# Patient Record
Sex: FEMALE | Race: WHITE
[De-identification: names, ages, dates, MRNs, and addresses within clinical notes are randomized per-mention and may not be internally consistent; named-entity substitution may affect disease eponyms.]

---

## 2017-07-17 ENCOUNTER — HOSPITAL ENCOUNTER (EMERGENCY)
Dept: HOSPITAL 41 - JD.ED | Age: 52
Discharge: HOME | End: 2017-07-17
Payer: COMMERCIAL

## 2017-07-17 VITALS — DIASTOLIC BLOOD PRESSURE: 76 MMHG | SYSTOLIC BLOOD PRESSURE: 162 MMHG

## 2017-07-17 DIAGNOSIS — H83.09: ICD-10-CM

## 2017-07-17 DIAGNOSIS — E78.00: ICD-10-CM

## 2017-07-17 DIAGNOSIS — E11.9: ICD-10-CM

## 2017-07-17 DIAGNOSIS — Z87.891: ICD-10-CM

## 2017-07-17 DIAGNOSIS — Z90.710: ICD-10-CM

## 2017-07-17 DIAGNOSIS — R07.89: Primary | ICD-10-CM

## 2017-07-17 DIAGNOSIS — Z88.6: ICD-10-CM

## 2017-07-17 PROCEDURE — 96361 HYDRATE IV INFUSION ADD-ON: CPT

## 2017-07-17 PROCEDURE — 99285 EMERGENCY DEPT VISIT HI MDM: CPT

## 2017-07-17 PROCEDURE — 36415 COLL VENOUS BLD VENIPUNCTURE: CPT

## 2017-07-17 PROCEDURE — 85025 COMPLETE CBC W/AUTO DIFF WBC: CPT

## 2017-07-17 PROCEDURE — 80053 COMPREHEN METABOLIC PANEL: CPT

## 2017-07-17 PROCEDURE — 93005 ELECTROCARDIOGRAM TRACING: CPT

## 2017-07-17 PROCEDURE — 96360 HYDRATION IV INFUSION INIT: CPT

## 2017-07-17 PROCEDURE — 84484 ASSAY OF TROPONIN QUANT: CPT

## 2017-07-17 NOTE — EDM.PDOC
ED HPI GENERAL MEDICAL PROBLEM





- General


Chief Complaint: Chest Pain


Stated Complaint: CHEST PAIN AND DIZZY


Time Seen by Provider: 07/17/17 07:42


Source of Information: Reports: Patient, Family, RN Notes Reviewed





- History of Present Illness


INITIAL COMMENTS - FREE TEXT/NARRATIVE: 





52-year-old lady comes in with severe vertigo but also anterior chest 

discomfort. This all started about 2 hours ago. She became aware of the vertigo 

when she first sat up in bed trying to get up to go to the bathroom. States the 

room was really "moving around on her. It was too severe to walk so after 

resting a bit she did crawl to and from the bathroom. However she also did have 

some anterior chest discomfort. She called her daughter who is in or in and 

with the associated chest discomfort and family history of stroke, heart 

disease and personal history of hyperlipidemia they felt she needed to be 

checked out. She continues to have vertigo on arrival to the ED. The vertigo is 

worse with motion, better to lie still.  She continues to have mild anterior 

chest heaviness.  She does not have pain radiating into either arm, neck or 

back. No abdominal pain. There's been some very mild nausea but no vomiting. 

She does have personal history of type 2 diabetes and hyperlipidemia. She does 

not smoke.


  ** Headache


Pain Score (Numeric/FACES): 5





  ** Chest


Pain Score (Numeric/FACES): 5





- Related Data


 Allergies











Allergy/AdvReac Type Severity Reaction Status Date / Time


 


oxycodone Allergy  Cannot Verified 07/17/17 07:39





   Remember  














Past Medical History


Cardiovascular History: Reports: High Cholesterol


Musculoskeletal History: Reports: RA


Endocrine/Metabolic History: Reports: Diabetes, Type II





- Past Surgical History


GI Surgical History: Reports: Hernia Repair/Other


Female  Surgical History: Reports: Hysterectomy





Social & Family History





- Tobacco Use


Smoking Status *Q: Former Smoker


Used Tobacco, but Quit: Yes


Month Tobacco Last Used: 20 years ago





- Caffeine Use


Caffeine Use: Reports: Coffee





- Recreational Drug Use


Recreational Drug Use: No





ED ROS GENERAL





- Review of Systems


Review Of Systems: See Below


Constitutional: Denies: Fever, Chills, Diaphoresis


HEENT: Reports: Vertigo.  Denies: Ear Pain, Sinus Problem


Respiratory: Denies: Shortness of Breath


Cardiovascular: Reports: Chest Pain (Mild anterior tightness and heaviness).  

Denies: Lightheadedness


GI/Abdominal: Reports: Nausea.  Denies: Abdominal Pain, Vomiting (Slight, gone)


Musculoskeletal: Denies: Neck Pain, Shoulder Pain, Arm Pain, Back Pain, Leg Pain


Skin: Reports: No Symptoms


Neurological: Reports: Dizziness, Difficulty Walking (Due to the severe vertigo)

.  Denies: Numbness, Tingling





ED EXAM, DIZZINESS





- Physical Exam


Exam: See Below


General Appearance: Alert, Anxious (Mild), Mild Distress


Eye Exam: Bilateral Eye: PERRL


Ears: Normal External Exam, Normal Canal, Normal TMs


Nose: Normal Inspection


Throat/Mouth: Normal Inspection, Normal Oropharynx


Head Exam: Atraumatic.  No: Facial Swelling


Vertigo: reproducible


Neck: Supple


Respiratory/Chest: No Respiratory Distress, Lungs Clear, Normal Breath Sounds


Cardiovascular: Regular Rate, Rhythm


GI/Abdominal: Soft, Non-Tender.  No: Guarding


Rectal (Female) Exam: Other


Neurological: No Motor/Sensory Deficits, Other (Finger to nose testing normal)


Extremities: Normal Inspection, Normal Range of Motion


Skin Exam: Warm, Dry, Normal Color





Course





- Vital Signs


Last Recorded V/S: 


 Last Vital Signs











Temp  97.2 F   07/17/17 07:32


 


Pulse  81   07/17/17 07:32


 


Resp  16   07/17/17 07:32


 


BP  162/76 H  07/17/17 07:32


 


Pulse Ox  97   07/17/17 07:32














- Orders/Labs/Meds


Orders: 


 Active Orders 24 hr











 Category Date Time Status


 


 EKG 12 Lead [EKG Documentation Completion] [RC] STAT Care  07/17/17 07:40 

Active


 


 Sodium Chloride 0.9% [Normal Saline] 1,000 ml Med  07/17/17 08:00 Active





 IV ASDIRECTED   








 Medication Orders





Sodium Chloride (Normal Saline)  1,000 mls @ 150 mls/hr IV ASDIRECTED FRANCES


   Last Admin: 07/17/17 08:01  Dose: 150 mls/hr








Labs: 


 Laboratory Tests











  07/17/17 07/17/17 07/17/17 Range/Units





  07:35 07:35 09:48 


 


WBC  6.58    (3.98-10.04)  K/mm3


 


RBC  5.00    (3.98-5.22)  M/mm3


 


Hgb  15.3    (11.2-15.7)  gm/L


 


Hct  45.5 H    (34.1-44.9)  %


 


MCV  91.0    (79.4-94.8)  fl


 


MCH  30.6    (25.6-32.2)  pg


 


MCHC  33.6    (32.2-35.5)  g/dl


 


RDW Std Deviation  42.9    (36.4-46.3)  fL


 


Plt Count  245    (182-369)  K/mm3


 


MPV  9.3 L    (9.4-12.3)  fl


 


Neut % (Auto)  39.4    (34.0-71.1)  %


 


Lymph % (Auto)  46.2    (19.3-51.7)  %


 


Mono % (Auto)  10.8    (4.7-12.5)  %


 


Eos % (Auto)  2.9    (0.7-5.8)  


 


Baso % (Auto)  0.5    (0.1-1.2)  %


 


Neut # (Auto)  2.60    (1.56-6.13)  K/mm3


 


Lymph # (Auto)  3.04    (1.18-3.74)  K/mm3


 


Mono # (Auto)  0.71 H    (0.24-0.36)  K/mm3


 


Eos # (Auto)  0.19    (0.04-0.36)  K/mm3


 


Baso # (Auto)  0.03    (0.01-0.08)  K/mm3


 


Sodium   139   (136-145)  mEq/L


 


Potassium   4.0   (3.5-5.1)  mEq/L


 


Chloride   102   ()  mEq/L


 


Carbon Dioxide   29   (21-32)  mEq/L


 


Anion Gap   12.0   (5-15)  


 


BUN   18   (7-18)  mg/dL


 


Creatinine   0.9   (0.55-1.02)  mg/dL


 


Est Cr Clr Drug Dosing   TNP   


 


Estimated GFR (MDRD)   > 60   (>60)  mL/min


 


BUN/Creatinine Ratio   20.0 H   (14-18)  


 


Glucose   139 H   ()  mg/dL


 


Calcium   9.1   (8.5-10.1)  mg/dL


 


Total Bilirubin   0.4   (0.2-1.0)  mg/dL


 


AST   20   (15-37)  U/L


 


ALT   19   (14-59)  U/L


 


Alkaline Phosphatase   23 L   ()  U/L


 


Troponin I   < 0.017  < 0.017  (0.00-0.056)  ng/mL


 


Total Protein   7.6   (6.4-8.2)  g/dl


 


Albumin   4.1   (3.4-5.0)  g/dl


 


Globulin   3.5   gm/dL


 


Albumin/Globulin Ratio   1.2   (1-2)  











Meds: 


Medications











Generic Name Dose Route Start Last Admin





  Trade Name Tona  PRN Reason Stop Dose Admin


 


Sodium Chloride  1,000 mls @ 150 mls/hr  07/17/17 08:00  07/17/17 08:01





  Normal Saline  IV   150 mls/hr





  ASDIRECTED FRANCES   Administration














Discontinued Medications














Generic Name Dose Route Start Last Admin





  Trade Name Tona  PRN Reason Stop Dose Admin


 


Acetaminophen  975 mg  07/17/17 08:41  





  Tylenol  PO  07/17/17 08:42  





  NOW ONE   


 


Ibuprofen  800 mg  07/17/17 08:46  07/17/17 08:49





  Motrin  PO  07/17/17 08:47  800 mg





  ONETIME ONE   Administration


 


Meclizine HCl  12.5 mg  07/17/17 08:26  07/17/17 08:36





  Antivert  PO  07/17/17 08:27  12.5 mg





  ONETIME ONE   Administration


 


Ondansetron HCl  4 mg  07/17/17 07:54  07/17/17 08:01





  Zofran Odt  PO  07/17/17 07:55  4 mg





  ONETIME ONE   Administration














- Re-Assessments/Exams


Free Text/Narrative Re-Assessment/Exam: 





07/17/17 11:28


EKG did not show acute abnormality. Troponin did come back negative. The second 

2 hour troponin which also did come back negative. Was Zofran, 12.5 mg Antivert

, time the vertigo is tremendously better. She does feel up to going home. 

Discharge instructions as documented





Departure





- Departure


Time of Disposition: 10:51


Disposition: Home, Self-Care 01


Condition: Fair


Clinical Impression: 


 Atypical chest pain





Labyrinthitis


Qualifiers:


 Laterality: unspecified laterality Qualified Code(s): H83.09 - Labyrinthitis, 

unspecified ear








- Discharge Information


Instructions:  Chest Wall Pain


Referrals: 


Fabiola Amaya PA [Primary Care Provider] - 


Forms:  ED Department Discharge


Additional Instructions: 


Rest, move slowly and carefully until dizziness completely resolves, you have 

been given Zofran for nausea and a 12.5 mg dose meclizine while here in the ED. 

Continue the Antivert or meclizine (available OTC) 12.5 mg twice daily for the 

next 2-3 days or until after symptoms have completely resolved and thereafter 

as needed. Follow-up clinic if not back to normal within 2-3 days as expected, 

return to ED as needed if symptoms worsening in any way





- My Orders


Last 24 Hours: 


My Active Orders





07/17/17 07:40


EKG 12 Lead [EKG Documentation Completion] [RC] STAT 





07/17/17 08:00


Sodium Chloride 0.9% [Normal Saline] 1,000 ml IV ASDIRECTED 














- Assessment/Plan


Last 24 Hours: 


My Active Orders





07/17/17 07:40


EKG 12 Lead [EKG Documentation Completion] [RC] STAT 





07/17/17 08:00


Sodium Chloride 0.9% [Normal Saline] 1,000 ml IV ASDIRECTED

## 2017-10-01 ENCOUNTER — HOSPITAL ENCOUNTER (EMERGENCY)
Dept: HOSPITAL 41 - JD.ED | Age: 52
Discharge: HOME | End: 2017-10-01
Payer: COMMERCIAL

## 2017-10-01 VITALS — SYSTOLIC BLOOD PRESSURE: 133 MMHG | DIASTOLIC BLOOD PRESSURE: 65 MMHG

## 2017-10-01 DIAGNOSIS — Z88.5: ICD-10-CM

## 2017-10-01 DIAGNOSIS — E78.00: ICD-10-CM

## 2017-10-01 DIAGNOSIS — Z87.891: ICD-10-CM

## 2017-10-01 DIAGNOSIS — E11.9: ICD-10-CM

## 2017-10-01 DIAGNOSIS — M79.605: Primary | ICD-10-CM

## 2017-10-01 NOTE — EDM.PDOC
ED HPI GENERAL MEDICAL PROBLEM





- General


Chief Complaint: Lower Extremity Injury/Pain


Stated Complaint: PAIN IN LET CALF


Time Seen by Provider: 10/01/17 19:37


Source of Information: Reports: Patient


History Limitations: Reports: No Limitations





- History of Present Illness


INITIAL COMMENTS - FREE TEXT/NARRATIVE: 





The patient presents with left leg pain.  She had surgery on her low back in 

[place] on Friday.  This morning she developed pain to the left lower leg.  

She denies any injury.  Her left calf is 1inch larger then her right.  She has 

no chest pain, shortness of breath, fever, or chills.  She does have a slight 

cough.  She has no history of DVT or PE.  She was not put on any blood 

thinners.  She has no pain in her back.


Onset: Gradual


Duration: Hour(s): (This morning)


Location: Reports: Lower Extremity, Left


Quality: Reports: Stabbing


Severity: Moderate


Improves with: Reports: None


Worsens with: Reports: None


Associated Symptoms: Reports: No Other Symptoms


  ** Left Lower Leg


Pain Score (Numeric/FACES): 8





- Related Data


 Allergies











Allergy/AdvReac Type Severity Reaction Status Date / Time


 


oxycodone Allergy  Cannot Verified 07/17/17 07:39





   Remember  














Past Medical History


Cardiovascular History: Reports: High Cholesterol


Musculoskeletal History: Reports: RA, Other (See Below)


Other Musculoskeletal History: lumbar surgery in [place] on Sept 


Endocrine/Metabolic History: Reports: Diabetes, Type II





- Past Surgical History


GI Surgical History: Reports: Hernia Repair/Other


Female  Surgical History: Reports: Hysterectomy





Social & Family History





- Tobacco Use


Smoking Status *Q: Former Smoker


Used Tobacco, but Quit: Yes


Month Tobacco Last Used: 31





- Caffeine Use


Caffeine Use: Reports: Soda





- Recreational Drug Use


Recreational Drug Use: No





Review of Systems





- Review of Systems


Review Of Systems: See Below


Constitutional: Reports: No Symptoms


Eyes: Reports: No Symptoms


Ears: Reports: No Symptoms


Nose: Reports: No Symptoms


Mouth/Throat: Reports: No Symptoms


Respiratory: Reports: No Symptoms


Cardiovascular: Reports: No Symptoms


GI/Abdominal: Reports: No Symptoms


Genitourinary: Reports: No Symptoms


Musculoskeletal: Reports: Other (Left lower leg pain)





ED EXAM, GENERAL





- Physical Exam


Exam: See Below


Exam Limited By: No Limitations


General Appearance: Alert, No Apparent Distress


Ears: Normal External Exam


Nose: Normal Inspection


Head: Atraumatic, Normocephalic


Neck: Normal Inspection


Respiratory/Chest: No Respiratory Distress, Lungs Clear, Normal Breath Sounds


Cardiovascular: Regular Rate, Rhythm, No Edema, No Murmur


GI/Abdominal: Soft, Non-Tender, No Organomegaly, No Mass


Back Exam: Normal Inspection


Extremities: Other (Pain upon palpation to the left lower leg.  Good sensation 

and pulses distally.  No noticable edema but 1inch difference between the 2 

lower legs.)





Course





- Vital Signs


Last Recorded V/S: 


 Last Vital Signs











Temp  97.5 F   10/01/17 19:34


 


Pulse  74   10/01/17 19:34


 


Resp  20   10/01/17 19:34


 


BP  133/65   10/01/17 19:34


 


Pulse Ox  98   10/01/17 19:34














- Orders/Labs/Meds


Orders: 


 Active Orders 24 hr











 Category Date Time Status


 


 VL Duplex Lwr Ext Veins Ltd Lt [US] Stat Exams  10/01/17 19:52 Taken














- Re-Assessments/Exams


Free Text/Narrative Re-Assessment/Exam: 





10/01/17 19:59


I have ordered an US of her left leg to rule out DVT.


10/01/17 22:16


The US does not show a DVT.  I feel this is nerve pain.  I will have her call 

her neurosurgeon in the morning.





Departure





- Departure


Time of Disposition: 22:20


Disposition: Home, Self-Care 01


Condition: Good


Clinical Impression: 


 Right leg pain








- Discharge Information


Referrals: 


Fabiola Amaya PA [Primary Care Provider] - 


Forms:  ED Department Discharge


Additional Instructions: 


Take your medication as prescribed.  Call your neurosurgeon's office tomorrow 

and let them know what is going on.  Please return if you are worse.





- My Orders


Last 24 Hours: 


My Active Orders





10/01/17 19:52


VL Duplex Lwr Ext Veins Ltd Lt [US] Stat 














- Assessment/Plan


Last 24 Hours: 


My Active Orders





10/01/17 19:52


VL Duplex Lwr Ext Veins Ltd Lt [US] Stat

## 2017-10-02 NOTE — US
Left lower extremity deep venous ultrasound: Duplex and color flow 

imaging was obtained of the left common femoral vein, greater 

saphenous veins, superficial femoral vein, popliteal vein, posterior 

tibial and peroneal veins.  Right common femoral vein was also 

evaluated.

 

Slightly limited phasic flow is noted within the posterior tibial and 

peroneal veins.  These veins otherwise show normal compression and 

augmentation.  Other veins show normal compression, phasic flow and 

augmentation.

 

Impression:

1.  No evidence of deep venous thrombosis is seen within the left 

lower extremity or within the right common femoral vein.

 

Diagnostic code #1

 

I agree with preliminary report issued by [facility] ([facility] report finalized 

on 10/01/17, 11:19 PM Central Time)

## 2018-01-24 ENCOUNTER — HOSPITAL ENCOUNTER (EMERGENCY)
Dept: HOSPITAL 41 - JD.ED | Age: 53
Discharge: HOME | End: 2018-01-24
Payer: COMMERCIAL

## 2018-01-24 VITALS — DIASTOLIC BLOOD PRESSURE: 65 MMHG | SYSTOLIC BLOOD PRESSURE: 125 MMHG

## 2018-01-24 DIAGNOSIS — E11.9: ICD-10-CM

## 2018-01-24 DIAGNOSIS — E78.00: ICD-10-CM

## 2018-01-24 DIAGNOSIS — Z79.84: ICD-10-CM

## 2018-01-24 DIAGNOSIS — Z79.899: ICD-10-CM

## 2018-01-24 DIAGNOSIS — Z91.048: ICD-10-CM

## 2018-01-24 DIAGNOSIS — I20.9: Primary | ICD-10-CM

## 2018-01-24 DIAGNOSIS — Z88.6: ICD-10-CM

## 2018-01-24 NOTE — CR
Chest:  Two views of the chest were obtained.

 

Comparison: Prior chest x-ray of 12/08/16.

 

Heart size and mediastinum are normal.  Lungs are clear.  Previous 

cervical spine surgery is noted.  Bony structures otherwise 

unremarkable for the patient's age.

 

Impression:

1.  Nothing acute is seen on two-view chest x-ray.

 

Diagnostic code #2

## 2018-01-24 NOTE — EDM.PDOC
ED HPI GENERAL MEDICAL PROBLEM





- General


Chief Complaint: Chest Pain


Stated Complaint: CHEST PAIN


Time Seen by Provider: 01/24/18 12:13


Source of Information: Reports: Patient





- History of Present Illness


INITIAL COMMENTS - FREE TEXT/NARRATIVE: 


Patient was sent here from cardiac rehabilitation for exertional angina.  

Patient states that she was looking on the treadmill today for less than 5 

minutes when she had chest pain radiating to both shoulders.  She did have an 

associated hot flash with this, no sweating/nausea/vomiting.  Mild ASM.


Patient states that this completely resolved within a minute or 2 of rest.


Patient did have previous episodes of this on Saturday night while dancing and 

Monday with carrying some boxes.  On both occasions patient states that her 

chest pain resolved with rest.


Patient has a history of 2 cardiac stents placed on 11/24/2017.  She fell 

follows with Dr. Ross/cardiology.


She is still on Plavix daily.


Patient is completely asymptomatic by her time of arrival to the emergency room.








- Related Data


 Allergies











Allergy/AdvReac Type Severity Reaction Status Date / Time


 


oxycodone Allergy  Nausea and Verified 01/24/18 12:06





   Vomiting  


 


suture Allergy  Other Verified 01/24/18 12:06











Home Meds: 


 Home Meds





Clopidogrel [Plavix] 75 mg PO DAILY 12/01/17 [History]


Empagliflozin/Linagliptin [Glyxambi 10 mg-5 mg Tablet] 1 tab PO DAILY 12/01/17 [

History]


Etanercept [Enbrel] 50 mg SQ WEEKLY 12/01/17 [History]


Metoprolol Tartrate 25 mg PO BID 12/01/17 [History]


Multivitamin [Multivitamins] 1 each PO DAILY 12/01/17 [History]


Pravastatin Sodium [Pravachol] 20 mg PO DAILY 12/01/17 [History]


Venlafaxine [Effexor] 37.5 mg PO DAILY 12/01/17 [History]


metFORMIN [Glucophage] 1,000 mg PO BID 12/01/17 [History]


Nitroglycerin 0.4 mg SL ASDIRECTED 01/24/18 [History]











Past Medical History


Cardiovascular History: Reports: High Cholesterol


Musculoskeletal History: Reports: RA, Other (See Below)


Other Musculoskeletal History: lumbar surgery in [place] on Sept 


Endocrine/Metabolic History: Reports: Diabetes, Type II





- Past Surgical History


GI Surgical History: Reports: Hernia Repair/Other


Female  Surgical History: Reports: Hysterectomy





Social & Family History





- Tobacco Use


Smoking Status *Q: Former Smoker


Used Tobacco, but Quit: Yes


Month Tobacco Last Used: 1988


Second Hand Smoke Exposure: No





- Caffeine Use


Caffeine Use: Reports: Coffee





- Recreational Drug Use


Recreational Drug Use: No





ED ROS GENERAL





- Review of Systems


Review Of Systems: See Below


Constitutional: Reports: No Symptoms


HEENT: Reports: No Symptoms


Respiratory: Reports: No Symptoms


Cardiovascular: Reports: Chest Pain, Dyspnea on Exertion


GI/Abdominal: Reports: No Symptoms


: Reports: No Symptoms


Musculoskeletal: Reports: No Symptoms


Skin: Reports: No Symptoms





ED EXAM, GENERAL





- Physical Exam


Exam: See Below


Exam Limited By: No Limitations


General Appearance: Alert, WD/WN, No Apparent Distress


Respiratory/Chest: No Respiratory Distress, Lungs Clear, Normal Breath Sounds, 

No Accessory Muscle Use, Chest Non-Tender


Cardiovascular: Normal Peripheral Pulses, Regular Rate, Rhythm, No Murmur, No 

Rub


GI/Abdominal: Normal Bowel Sounds, Soft, Non-Tender


Neurological: Alert, Oriented


Psychiatric: Normal Affect, Normal Mood


Skin Exam: Warm, Dry, Intact





EKG INTERPRETATION


EKG Date: 01/24/18


Time: 12:09


Rhythm: NSR


Rate (Beats/Min): 58


EKG Interpretation Comments: 


Old anteroseptal infarct


Mild QT prolongation


Reviewed with Dr Sanderson








Course





- Vital Signs


Last Recorded V/S: 


 Last Vital Signs











Temp  98.8 F   01/24/18 12:02


 


Pulse  65   01/24/18 12:02


 


Resp  18   01/24/18 12:02


 


BP  135/74   01/24/18 12:02


 


Pulse Ox  96   01/24/18 12:02














- Orders/Labs/Meds


Orders: 


 Active Orders 24 hr











 Category Date Time Status


 


 EKG 12 Lead [EKG Documentation Completion] [RC] STAT Care  01/24/18 12:05 

Active


 


 CXR [Chest 2V] [CR] Stat Exams  01/24/18 12:16 Taken


 


 UA W/MICROSCOPIC [URIN] Stat Lab  01/24/18 12:16 Cancelled











Labs: 


 Laboratory Tests











  01/24/18 01/24/18 Range/Units





  12:15 12:15 


 


WBC  6.08   (3.98-10.04)  K/mm3


 


RBC  4.88   (3.98-5.22)  M/mm3


 


Hgb  14.5   (11.2-15.7)  gm/L


 


Hct  43.3   (34.1-44.9)  %


 


MCV  88.7   (79.4-94.8)  fl


 


MCH  29.7   (25.6-32.2)  pg


 


MCHC  33.5   (32.2-35.5)  g/dl


 


RDW Std Deviation  40.5   (36.4-46.3)  fL


 


Plt Count  245   (182-369)  K/mm3


 


MPV  10.0   (9.4-12.3)  fl


 


Neutrophils % (Manual)  54   (40-60)  %


 


Band Neutrophils %  1   (0-10)  %


 


Lymphocytes % (Manual)  39   (20-40)  %


 


Atypical Lymphs %  0   %


 


Monocytes % (Manual)  2   (2-10)  %


 


Eosinophils % (Manual)  4   (0.7-5.8)  %


 


Basophils % (Manual)  0 L   (0.1-1.2)  


 


Platelet Estimate  Adequate   


 


RBC Morph Comment  Normal   


 


Sodium   140  (136-145)  mEq/L


 


Potassium   4.2  (3.5-5.1)  mEq/L


 


Chloride   104  ()  mEq/L


 


Carbon Dioxide   20 L  (21-32)  mEq/L


 


Anion Gap   20.2 H  (5-15)  


 


BUN   16  (7-18)  mg/dL


 


Creatinine   0.9  (0.55-1.02)  mg/dL


 


Est Cr Clr Drug Dosing   56.43  mL/min


 


Estimated GFR (MDRD)   > 60  (>60)  mL/min


 


BUN/Creatinine Ratio   17.8  (14-18)  


 


Glucose   110 H  ()  mg/dL


 


Calcium   9.3  (8.5-10.1)  mg/dL


 


Total Bilirubin   0.3  (0.2-1.0)  mg/dL


 


AST   30  (15-37)  U/L


 


ALT   46  (14-59)  U/L


 


Alkaline Phosphatase   26 L  ()  U/L


 


Troponin I   < 0.017  (0.00-0.056)  ng/mL


 


Total Protein   7.5  (6.4-8.2)  g/dl


 


Albumin   4.1  (3.4-5.0)  g/dl


 


Globulin   3.4  gm/dL


 


Albumin/Globulin Ratio   1.2  (1-2)  














- Re-Assessments/Exams


Free Text/Narrative Re-Assessment/Exam: 


Patient's EKG was similar to previous, as was chest x-ray.  CBC/CMP are 

unremarkable and troponin was negative.


Patient does need to follow up with cardiologist regarding her exertional 

angina and she will call today to get this scheduled. 


She will return to ER if needed for chest pain as well. 


Patient is completely asymptomatic and requesting to leave.


01/24/18 13:27








Departure





- Departure


Time of Disposition: 13:18


Disposition: Home, Self-Care 01


Condition: Good


Clinical Impression: 


 Exertional angina





Referrals: 


Fabiola Amaya PA [Primary Care Provider] - 


Forms:  ED Department Discharge


Additional Instructions: 


You were evaluated in the emergency room for chest pain with exertion.


Your EKG did not demonstrate any new changes and your heart enzymes were 

negative.


I recommend that you keep your nitroglycerin with you and use if needed. 


Follow-up with your cardiologist in then next 1-2 weeks (call today to schedule 

this) or certainly return to the emergency room if needed.  





- My Orders


Last 24 Hours: 


My Active Orders





01/24/18 12:05


EKG 12 Lead [EKG Documentation Completion] [RC] STAT 





01/24/18 12:16


CXR [Chest 2V] [CR] Stat 


UA W/MICROSCOPIC [URIN] Stat  (Cancelled)














- Assessment/Plan


Last 24 Hours: 


My Active Orders





01/24/18 12:05


EKG 12 Lead [EKG Documentation Completion] [RC] STAT 





01/24/18 12:16


CXR [Chest 2V] [CR] Stat 


UA W/MICROSCOPIC [URIN] Stat  (Cancelled)

## 2021-05-07 NOTE — CR
Chest: Portable view of the chest was obtained in AP projection.

 

Comparison: Prior chest x-ray of 10/08/18.

 

Heart size and mediastinum are normal.  Lungs are clear with no acute 

parenchymal change.  No acute osseous abnormality is appreciated.  

Prior cholecystectomy is noted with surgical clips.

 

Impression:

1.  Nothing acute is appreciated on portable chest x-ray.

 

Diagnostic code #2

## 2021-05-07 NOTE — EDM.PDOC
ED HPI GENERAL MEDICAL PROBLEM





- General


Chief Complaint: Chest Pain


Stated Complaint: CHEST PAIN


Time Seen by Provider: 05/07/21 10:18


Source of Information: Reports: Patient


History Limitations: Reports: No Limitations





- History of Present Illness


INITIAL COMMENTS - FREE TEXT/NARRATIVE: 


55-year-old female presents to the ED for evaluation of acute onset of severe 

central chest pain just to the right of her mid sternum radiating into her right

arm down to her hand.  Patient has a known history of coronary artery disease 

having 2 stents placed in 2018 one in January and 1 in March of that year.  She 

reports no known myocardial infarction.  She took 2 nitroglycerin tablets 15 

minutes apart and the pain completely dissipated after about 7 minutes after 

taking the last nitro..  She estimates the pain lasted perhaps 1/2-hour in 

total.  She now feels back to normal.  She has rarely had to use nitroglycerin 

tablets since stent placement.  She has been better the last 2-1/2 months after 

being placed on amlodipine 2.5 mg daily.  She denies cough sputum production 

fever or chills.  She reports that she was under good deal of stress this 

morning dealing with local [employer] store.  She developed chest pain shortly 

after starting the conversation with employee at the [employer] store.  She 

reports she has been having some right shoulder difficulties and by history has 

some rotator cuff inflammation in the distribution of suspected supraspinatus 

tendon on the right side.  Patient remains on Plavix 75 mg daily.  Does not take

daily aspirin.   She is a type II diabetic for many years as well.


Onset: Today, Sudden


Onset Date: 05/07/21


Onset Time: 09:40


Duration: Minutes:


Location: Reports: Chest (Central chest pressure discomfort which was very 

intense radiated into her right shoulder and down her arm.)


Quality: Reports: Ache, Pressure


Severity: Moderate (8 out of 10)


Improves with: Reports: Medication (0.4 mg strength took the pain away 

completely.)


Worsens with: Reports: None


Context: Reports: Other (Sudden onset of severe central chest pain.  She reports

she was under good deal of stress this morning dealing with local [employer] store

people).  Denies: Activity, Exercise, Lifting, Sick Contact, Trauma


Associated Symptoms: Reports: Chest Pain.  Denies: Confusion, Cough, cough w 

sputum, Diaphoresis, Fever/Chills, Headaches, Loss of Appetite, Malaise, 

Nausea/Vomiting, Rash, Seizure, Shortness of Breath, Syncope, Weakness


Treatments PTA: Reports: Other (see below) (Patient took 2 nitroglycerin tablets

10 minutes apart 0.4 mg strength.)





- Related Data


                                    Allergies











Allergy/AdvReac Type Severity Reaction Status Date / Time


 


oxycodone Allergy  Nausea and Verified 05/07/21 10:17





   Vomiting  


 


suture Allergy  Other Verified 05/07/21 10:17











Home Meds: 


                                    Home Meds





Empagliflozin/Linagliptin [Glyxambi 10 mg-5 mg Tablet] 1 tab PO DAILY 12/01/17 

[History]


Metoprolol Tartrate 25 mg PO BID 12/01/17 [History]


Multivitamin [Multivitamins] 1 each PO DAILY 12/01/17 [History]


Venlafaxine [Effexor] 37.5 mg PO DAILY 12/01/17 [History]


metFORMIN [Glucophage] 1,000 mg PO BID 12/01/17 [History]


Nitroglycerin 0.4 mg SL ASDIRECTED 01/24/18 [History]


Evolocumab [Repatha Sureclick] 1 dose SUBCNJ WEEKLY 05/07/21 [History]


Tocilizumab [Actemra] 0 mg SUBCUT DAILY 05/07/21 [History]


amLODIPine [Norvasc] 2.5 mg PO DAILY 05/07/21 [History]











Past Medical History


HEENT History: Reports: Other (See Below)


Other HEENT History: dental implants


Cardiovascular History: Reports: Angina, High Cholesterol, MI (She claims no 

myocardial infarction.), Stents (She reports she has 2 stents in place.  Not 

sure which vessels.  She had one placed in January and one placed in March 2018.).  Denies: Heart Failure


Gastrointestinal History: Reports: GERD


Genitourinary History: Reports: Other (See Below)


Other Genitourinary History: recurrent bladder infection


OB/GYN History: Reports: Pregnancy


Musculoskeletal History: Reports: RA (Primarily affecting her MCP joints in her 

hands and her MTP joints in her feet are worse than her hands.  Mild affecting 

her knees.  Elbows are good and wrists are good.), Other (See Below)


Other Musculoskeletal History: lumbar surgery in [place] on Sept 


Psychiatric History: Reports: Depression


Endocrine/Metabolic History: Reports: Diabetes, Type II, Obesity/BMI 30+





- Past Surgical History


GI Surgical History: Reports: Hernia Repair/Other


Female  Surgical History: Reports: Hysterectomy


Musculoskeletal Surgical History: Reports: Other (See Below)


Other Musculoskeletal Surgeries/Procedures:: cervical fusion 2011





Social & Family History





- Tobacco Use


Tobacco Use Status *Q: Never Tobacco User





- Caffeine Use


Caffeine Use: Reports: None





- Recreational Drug Use


Recreational Drug Use: No





- Living Situation & Occupation


Living situation: Reports: 


Occupation: Retired





ED ROS GENERAL





- Review of Systems


Review Of Systems: See Below


Constitutional: Denies: Fever, Chills, Malaise, Weakness, Fatigue, Decreased 

Appetite, Weight Loss


HEENT: Reports: No Symptoms


Respiratory: Reports: No Symptoms.  Denies: Shortness of Breath, Wheezing, 

Pleuritic Chest Pain, Cough, Sputum


Cardiovascular: Reports: Chest Pain (Severe central chest pain just to the right

 of the sternum rating to the right shoulder and down her arm this morning that 

lasts about 1/2-hour.).  Denies: Blood Pressure Problem, Claudication, Dyspnea 

on Exertion, Edema, Lightheadedness, Orthopnea, Palpitations


Endocrine: Reports: Fatigue


GI/Abdominal: Reports: No Symptoms


: Reports: No Symptoms


Musculoskeletal: Reports: Neck Pain, Shoulder Pain, Back Pain (Right side 

previous lumbar spine fusion in 2017.), Joint Swelling (Patient has rheumatoid 

arthritis for many years.), Other (Hand and foot pain)


Skin: Reports: Bruising


Neurological: Reports: No Symptoms (Appreciates easy bruising as she is on 

Plavix.), Other (Occasional angina on exertion.).  Denies: Confusion, Dizziness,

 Headache, Paresthesia, Pre-Existing Deficit, Seizure, Syncope, Tremors, Trouble

 Speaking, Difficulty Walking


Psychiatric: Reports: No Symptoms


Hematologic/Lymphatic: Reports: No Symptoms


Immunologic: Reports: No Symptoms





ED EXAM, GENERAL





- Physical Exam


Exam: See Below


Exam Limited By: No Limitations


General Appearance: Alert, WD/WN, Anxious, Mild Distress, Other (Temperature is 

36.4 degrees.  Heart rate 77 and sinus respiratory is 13 with O2 sats of 98% 

room air BP 1/16/1977.)


Eye Exam: Bilateral Eye: Normal Inspection


Throat/Mouth: Normal Inspection, Normal Lips, Normal Oropharynx


Head: Atraumatic, Normocephalic


Neck: Normal Inspection, Supple, Non-Tender, Full Range of Motion.  No: Carotid 

Bruit, Lymphadenopathy (L), Lymphadenopathy (R)


Respiratory/Chest: No Respiratory Distress, Lungs Clear, Normal Breath Sounds, 

No Accessory Muscle Use


Cardiovascular: Normal Peripheral Pulses, Regular Rate, Rhythm, No Edema, No 

Gallop, No Murmur, No Rub


Peripheral Pulses: 2+: Posterior Tibial (L), Posterior Tibial (R), Dorsalis 

Pedis (L), Dorsalis Pedis (R), 3+: Carotid (L), Carotid (R)


GI/Abdominal: Normal Bowel Sounds, Soft, Non-Tender, No Organomegaly, No 

Distention, No Abnormal Bruit


Back Exam: Other (Surgical scar lumbar spine.  Has had previous lumbar spine 

fusion in 2017 in [place])


Extremities: Other (Stigmata of rheumatoid arthritis affecting 

metacarpophalangeal joints and metatarsal joints hands and feet.  Also wrists 

and knees are involved.).  No: Normal Inspection


Neurological: Alert, Oriented, CN II-XII Intact, Normal Cognition


Psychiatric: Anxious


Skin Exam: Warm, Dry, Intact, Normal Color, No Rash


  ** #1 Interpretation


EKG Date: 05/07/21


Time: 10:07


Rhythm: NSR


Rate (Beats/Min): 74


Axis: LAD-Left Axis Deviation (-43 degrees)


P-Wave: Enlarged


QRS: Other (Left atrial hypertrophy decreased voltage precordial leads.  Q waves

 V1 to V3 compared with old anteroseptal myocardial infarction.  New Q waves in 

leads II, III and aVF compared with old inferior wall myocardial infarction.)


ST-T: Normal


QT: Normal


EKG Interpretation Comments: 





Abnormal ECG





Course





- Vital Signs


Last Recorded V/S: 


                                Last Vital Signs











Temp  36.4 C   05/07/21 10:06


 


Pulse  70   05/07/21 12:32


 


Resp  13   05/07/21 10:06


 


BP  120/76   05/07/21 12:32


 


Pulse Ox  100   05/07/21 12:32














- Orders/Labs/Meds


Labs: 


                                Laboratory Tests











  05/07/21 05/07/21 05/07/21 Range/Units





  10:41 10:41 10:41 


 


WBC  3.52 L    (3.98-10.04)  K/mm3


 


RBC  4.96    (3.98-5.22)  M/mm3


 


Hgb  15.1    (11.2-15.7)  gm/dl


 


Hct  45.8 H    (34.1-44.9)  %


 


MCV  92.3    (79.4-94.8)  fl


 


MCH  30.4    (25.6-32.2)  pg


 


MCHC  33.0    (32.2-35.5)  g/dl


 


RDW Std Deviation  43.7    (36.4-46.3)  fL


 


Plt Count  209    (182-369)  K/mm3


 


MPV  9.6    (9.4-12.3)  fl


 


Neut % (Auto)  38.6    (34.0-71.1)  %


 


Lymph % (Auto)  41.2    (19.3-51.7)  %


 


Mono % (Auto)  13.6 H    (4.7-12.5)  %


 


Eos % (Auto)  6.0 H    (0.7-5.8)  


 


Baso % (Auto)  0.6    (0.1-1.2)  %


 


Neut # (Auto)  1.36 L    (1.56-6.13)  K/mm3


 


Lymph # (Auto)  1.45    (1.18-3.74)  K/mm3


 


Mono # (Auto)  0.48 H    (0.24-0.36)  K/mm3


 


Eos # (Auto)  0.21    (0.04-0.36)  K/mm3


 


Baso # (Auto)  0.02    (0.01-0.08)  K/mm3


 


PT   11.1   (9.7-12.0)  SECONDS


 


INR   1.04   


 


APTT   20.5 L   (21.7-31.4)  SECONDS


 


Sodium    143  (136-145)  mEq/L


 


Potassium    3.8  (3.5-5.1)  mEq/L


 


Chloride    104  ()  mEq/L


 


Carbon Dioxide    25  (21-32)  mEq/L


 


Anion Gap    17.8 H  (5-15)  


 


BUN    15  (7-18)  mg/dL


 


Creatinine    0.8  (0.55-1.02)  mg/dL


 


Est Cr Clr Drug Dosing    61.37  mL/min


 


Estimated GFR (MDRD)    > 60  (>60)  mL/min


 


BUN/Creatinine Ratio    18.8 H  (14-18)  


 


Glucose    108 H  ()  mg/dL


 


Calcium    8.8  (8.5-10.1)  mg/dL


 


Magnesium    1.8  (1.8-2.4)  mg/dl


 


Total Bilirubin    0.3  (0.2-1.0)  mg/dL


 


AST    28  (15-37)  U/L


 


ALT    50  (14-59)  U/L


 


Alkaline Phosphatase    16 L  ()  U/L


 


CK-MB (CK-2)    1.2  (0-3.6)  ng/ml


 


Troponin I    < 0.017  (0.00-0.056)  ng/mL


 


C-Reactive Protein    <0.2  (<1.0)  mg/dL


 


NT-Pro-B Natriuret Pep     (0-125)  pg/mL


 


Total Protein    7.3  (6.4-8.2)  g/dl


 


Albumin    4.1  (3.4-5.0)  g/dl


 


Globulin    3.2  gm/dL


 


Albumin/Globulin Ratio    1.3  (1-2)  














  05/07/21 Range/Units





  10:41 


 


WBC   (3.98-10.04)  K/mm3


 


RBC   (3.98-5.22)  M/mm3


 


Hgb   (11.2-15.7)  gm/dl


 


Hct   (34.1-44.9)  %


 


MCV   (79.4-94.8)  fl


 


MCH   (25.6-32.2)  pg


 


MCHC   (32.2-35.5)  g/dl


 


RDW Std Deviation   (36.4-46.3)  fL


 


Plt Count   (182-369)  K/mm3


 


MPV   (9.4-12.3)  fl


 


Neut % (Auto)   (34.0-71.1)  %


 


Lymph % (Auto)   (19.3-51.7)  %


 


Mono % (Auto)   (4.7-12.5)  %


 


Eos % (Auto)   (0.7-5.8)  


 


Baso % (Auto)   (0.1-1.2)  %


 


Neut # (Auto)   (1.56-6.13)  K/mm3


 


Lymph # (Auto)   (1.18-3.74)  K/mm3


 


Mono # (Auto)   (0.24-0.36)  K/mm3


 


Eos # (Auto)   (0.04-0.36)  K/mm3


 


Baso # (Auto)   (0.01-0.08)  K/mm3


 


PT   (9.7-12.0)  SECONDS


 


INR   


 


APTT   (21.7-31.4)  SECONDS


 


Sodium   (136-145)  mEq/L


 


Potassium   (3.5-5.1)  mEq/L


 


Chloride   ()  mEq/L


 


Carbon Dioxide   (21-32)  mEq/L


 


Anion Gap   (5-15)  


 


BUN   (7-18)  mg/dL


 


Creatinine   (0.55-1.02)  mg/dL


 


Est Cr Clr Drug Dosing   mL/min


 


Estimated GFR (MDRD)   (>60)  mL/min


 


BUN/Creatinine Ratio   (14-18)  


 


Glucose   ()  mg/dL


 


Calcium   (8.5-10.1)  mg/dL


 


Magnesium   (1.8-2.4)  mg/dl


 


Total Bilirubin   (0.2-1.0)  mg/dL


 


AST   (15-37)  U/L


 


ALT   (14-59)  U/L


 


Alkaline Phosphatase   ()  U/L


 


CK-MB (CK-2)   (0-3.6)  ng/ml


 


Troponin I   (0.00-0.056)  ng/mL


 


C-Reactive Protein   (<1.0)  mg/dL


 


NT-Pro-B Natriuret Pep  47  (0-125)  pg/mL


 


Total Protein   (6.4-8.2)  g/dl


 


Albumin   (3.4-5.0)  g/dl


 


Globulin   gm/dL


 


Albumin/Globulin Ratio   (1-2)  











Meds: 


Medications














Discontinued Medications














Generic Name Dose Route Start Last Admin





  Trade Name Freq  PRN Reason Stop Dose Admin


 


Aspirin  324 mg  05/07/21 10:18  05/07/21 10:58





  Aspirin 81 Mg Tab.Chew  PO  05/07/21 10:19  324 mg





  ONETIME ONE   Administration


 


Sodium Chloride  1,000 mls @ 100 mls/hr  05/07/21 10:30  05/07/21 11:00





  Normal Saline  IV   100 mls/hr





  ASDIRECTED [facility]   Administration














- Radiology Interpretation


Free Text/Narrative:: 


55-year-old female with known coronary artery disease and long history of type 2

 diabetes presents to the ED with sudden onset of severe retrosternal mid chest 

pain that radiated slightly to the right right of the sternum into her right 

shoulder and down her right arm.  She took 2 nitroglycerin tablets 0.4 mg drink 

sublingually about 10 minutes apart with complete resolution of the chest 

discomfort.  Upon arrival in the ED she is pain-free.  ECG done immediately 

shows no sign of acute ischemia.  It does show evidence of old anteroseptal and 

inferior wall myocardial infarction's.  Examination is otherwise normal.  Plan 

IV normal saline at 100 mils per hour.  Aspirin 324 mg chewed.  She will have 

cardiac markers drawn and other routine labs.








- Re-Assessments/Exams


Free Text/Narrative Re-Assessment/Exam: 





05/07/21 11:20 chest x-ray done portably reveals heart size and mediastinum to 

be normal.  Lungs are clear with no acute parenchymal changes.  No acute osseous

 abnormalities are appreciated.  Prior cholecystectomy is noted with surgical 

clips right upper quadrant.  Patient reports she has a mild headache at this 

time likely from the nitroglycerin.  Blood pressure is down to 111/60.





05/07/21 12:06 White count is a little bit on the low side at 3.52.  The 

differential shows 38.6 per 6% neutrophils and 41.2% lymphocytes and an elevated

 monocyte count at 13.6.  Hemoglobin is 15.1 with hematocrit of 45.8.  MCV is 

normal at 92.3.  Platelet count 209,000 PT is 11.1 with an INR of 1.04 and an 

PTT of 20.5.  Sodium is 143 with a potassium of 3.8.  Chloride is 104 the bicarb

 is 25.  Anion gap is mildly elevated at 17.8.  BUN is 15 with a creatinine of 

0.8 and a GFR greater than 60.  Glucose is 108.  Calcium is 8.8.  Magnesium 1.8.

  Liver function is normal.  CK-MB fraction is 1.2 and troponin I is less than 

0.017.  C-reactive protein is less than 0.2.  BNP is 47.  Total protein 7.3 with

 an albumin fraction of 4.1.. Patient reports now that she is no longer on 

Enbrel and is now on Actemra.  This was prescribed by a rheumatologist in [facility].  She states it is markedly improve the inflammation in her 

MCP joints and her feet which are worse in her hands.  She also reports that she

 is been off of long-acting Imdur 60 mg daily for a lengthy period of time.  

Plan will be to get her up walking in the hallway at fairly fast pace to make 

sure she does not develop develop any further chest pain.  She is remained 

complete chest pain-free since arrival in the ED.





05/07/21 12:20 : Patient did fine walking in the hallway 3 times around in the 

ED without any development of chest pain.  She will therefore be discharged home

 to continue all medications as prior.  I believe her chest pain was likely 

cardiac in origin i.e. unstable angina due to being angry and stressed.  It also

 was relieved by nitroglycerin x2 tablets.  She has regular visits with her 

cardiologist and will be following up with him shortly.








Departure





- Departure


Time of Disposition: 12:18


Disposition: Home, Self-Care 01


Reason for Transfer *Q: Other


Condition: Fair


Clinical Impression: 


 Chest pain due to CAD, Angina pectoris





Instructions:  Angina


Referrals: 


Ayesha Geiger MD [Primary Care Provider] - 


Forms:  ED Department Discharge


Additional Instructions: 


Evaluation in the emergency room this morning in regards to development of 

significant severe pressure mid chest slightly to the right of the sternum 

radiating to the right shoulder and down to the right hand.  This was relieved 

with nitroglycerin tablets x2 taken about 5 minutes apart.  Evaluation in the 

emergency room shows a normal ECG other than old changes of myocardial injury.  

Chest x-ray was normal.  Lab tests were also completely normal other than a 

slightly low white count at 3.52.  I would suggest having this rechecked in 10 

to 14 days with Dr. Geiegr to make sure this is not a side effect of current 

medication Actemra being used to treat your rheumatoid arthritis.  Activity as 

tolerated.  However if chest pain returns you should return to the ED.





Sepsis Event Note (ED)





- Evaluation


Sepsis Screening Result: No Definite Risk





- Focused Exam


Vital Signs: 


                                   Vital Signs











  Temp Pulse Resp BP Pulse Ox


 


 05/07/21 12:32   70   120/76  100


 


 05/07/21 10:06  36.4 C  77  13  116/77  98

## 2021-12-23 NOTE — EDM.PDOC
ED HPI GENERAL MEDICAL PROBLEM





- General


Chief Complaint: Abdominal Pain


Stated Complaint: ABDOMINAL PAIN


Time Seen by Provider: 12/23/21 16:15


Source of Information: Reports: Patient


History Limitations: Reports: No Limitations





- History of Present Illness


INITIAL COMMENTS - FREE TEXT/NARRATIVE: 





Patient is a 56-year-old female presenting to the emergency room with a chief 

complaint of lower abdominal pain.  Patient denies any similar history of this 

pain.  The pain seems to be worse in the right side but it occurs bilaterally.  

Duration is several days.  She was previously seen in the walk-in clinic and had

a UA done which was negative.  She is concerned she may have a urinary tract 

infection.  However, she does deny dysuria or hematuria.  Denies fevers, flank 

pain, epigastric pain.  Pain does not seem to be worse with movement.  She does 

have an appropriate appetite.  Bowel movements have been normal.


  ** Abdominal


Pain Score (Numeric/FACES): 4





- Related Data


                                    Allergies











Allergy/AdvReac Type Severity Reaction Status Date / Time


 


suture Allergy  Other Verified 12/23/21 15:41


 


oxycodone AdvReac  Nausea and Verified 12/23/21 15:41





   Vomiting  











Home Meds: 


                                    Home Meds





Metoprolol Tartrate 25 mg PO BID 12/01/17 [History]


metFORMIN [Glucophage] 1,000 mg PO BID 12/01/17 [History]


Evolocumab [Repatha Sureclick] 1 dose [id number] ASDIRECTED 05/07/21 [History]


DULoxetine [Cymbalta] 60 mg PO DAILY 12/24/21 [History]


Isosorbide Mononitrate [Isosorbide Mononitrate ER] 30 mg PO DAILY 12/24/21 

[History]


Leflunomide 20 mg PO DAILY 12/24/21 [History]


Progesterone, Micronized [Progesterone] 100 mg PO BEDTIME 12/24/21 [History]


Venlafaxine HCl [Venlafaxine ER] 37.5 mg PO DAILY 12/24/21 [History]


amLODIPine [Norvasc] 5 mg PO DAILY 12/24/21 [History]











Past Medical History


HEENT History: Reports: Other (See Below)


Other HEENT History: dental implants


Cardiovascular History: Reports: Angina, High Cholesterol, MI, Stents


Gastrointestinal History: Reports: GERD


Genitourinary History: Reports: Other (See Below)


Other Genitourinary History: recurrent bladder infection


OB/GYN History: Reports: Pregnancy


Musculoskeletal History: Reports: RA, Other (See Below)


Other Musculoskeletal History: lumbar surgery in [place] on Sept 


Psychiatric History: Reports: Depression


Endocrine/Metabolic History: Reports: Diabetes, Type II, Obesity/BMI 30+





- Past Surgical History


GI Surgical History: Reports: Hernia Repair/Other


Female  Surgical History: Reports: Hysterectomy


Musculoskeletal Surgical History: Reports: Other (See Below)


Other Musculoskeletal Surgeries/Procedures:: cervical fusion 2011





Social & Family History





- Tobacco Use


Tobacco Use Status *Q: Never Tobacco User


Second Hand Smoke Exposure: No





- Caffeine Use


Caffeine Use: Reports: Coffee, Soda





- Recreational Drug Use


Recreational Drug Use: No





- Living Situation & Occupation


Living situation: Reports: 


Occupation: Retired





ED ROS GENERAL





- Review of Systems


Review Of Systems: See Below


Free Text/Narrative/Comment: 





In addition to that documented in the HPI above, the additional ROS was 

obtained:


Constitutional: Denies fevers or chills


Eyes: Denies vision changes


ENMT: Denies sore throat


CV: Denies chest pain


Resp: Denies SOB


GI: Denies vomiting or diarrhea


: Denies painful urination


MSK: Denies recent trauma


Skin: Denies new rashes


Neuro: Denies new numbness or tingling or weakness


Endocrine: Denies unexpected weight loss


Heme: Denies bleeding disorders








ED EXAM, GI/ABD





- Physical Exam


Exam: See Below


Text/Narrative:: 





I have reviewed the triage vital signs


Const: Well nourished, well developed, appears stated age


Eyes: Pupils Equal and reactive to light bilaterally, no conjunctival injection


HENT: No signs of trauma or swelling, Neck supple without meningismus 


CV: Regular Rate Rhythm, Warm, well-perfused extremities


RESP: Unlabored respiratory effort


GI: soft, non-tender, non-distended, no masses


MSK: No gross deformities appreciated


Skin: Warm, dry. No rashes


Neuro: Alert, CNs II-XII grossly intact. Sensation and motor function of 

extremities grossly intact.


Psych: Appropriate mood and affect.








Course





- Vital Signs


Last Recorded V/S: 


                                Last Vital Signs











Temp  36.9 C   12/23/21 15:38


 


Pulse  100   12/23/21 15:38


 


Resp  20   12/23/21 15:38


 


BP  138/74   12/23/21 15:38


 


Pulse Ox  95   12/23/21 15:38














- Orders/Labs/Meds


Labs: 


                                Laboratory Tests











  12/23/21 12/23/21 12/23/21 Range/Units





  15:50 15:50 15:56 


 


WBC  4.95    (3.98-10.04)  K/mm3


 


RBC  4.39    (3.98-5.22)  M/mm3


 


Hgb  14.0    (11.2-15.7)  gm/dl


 


Hct  41.8    (34.1-44.9)  %


 


MCV  95.2 H    (79.4-94.8)  fl


 


MCH  31.9    (25.6-32.2)  pg


 


MCHC  33.5    (32.2-35.5)  g/dl


 


RDW Std Deviation  40.9    (36.4-46.3)  fL


 


Plt Count  195  D    (182-369)  K/mm3


 


MPV  9.9    (9.4-12.3)  fl


 


Neut % (Auto)  42.4    (34.0-71.1)  %


 


Lymph % (Auto)  39.2    (19.3-51.7)  %


 


Mono % (Auto)  12.1    (4.7-12.5)  %


 


Eos % (Auto)  5.7    (0.7-5.8)  


 


Baso % (Auto)  0.4    (0.1-1.2)  %


 


Neut # (Auto)  2.10    (1.56-6.13)  K/mm3


 


Lymph # (Auto)  1.94    (1.18-3.74)  K/mm3


 


Mono # (Auto)  0.60 H    (0.24-0.36)  K/mm3


 


Eos # (Auto)  0.28    (0.04-0.36)  K/mm3


 


Baso # (Auto)  0.02    (0.01-0.08)  K/mm3


 


Sodium   139   (136-145)  mEq/L


 


Potassium   4.0   (3.5-5.1)  mEq/L


 


Chloride   102   ()  mEq/L


 


Carbon Dioxide   25   (21-32)  mEq/L


 


Anion Gap   16.0 H   (5-15)  


 


BUN   10   (7-18)  mg/dL


 


Creatinine   0.7   (0.55-1.02)  mg/dL


 


Est Cr Clr Drug Dosing   69.33   mL/min


 


Estimated GFR (MDRD)   > 60   (>60)  mL/min


 


BUN/Creatinine Ratio   14.3   (14-18)  


 


Glucose   214 H   (70-99)  mg/dL


 


Calcium   9.3   (8.5-10.1)  mg/dL


 


Total Bilirubin   0.3   (0.2-1.0)  mg/dL


 


AST   54 H   (15-37)  U/L


 


ALT   73 H   (14-59)  U/L


 


Alkaline Phosphatase   22 L   ()  U/L


 


Total Protein   6.6   (6.4-8.2)  g/dl


 


Albumin   3.9   (3.4-5.0)  g/dl


 


Globulin   2.7   gm/dL


 


Albumin/Globulin Ratio   1.4   (1-2)  


 


Lipase   190   ()  U/L


 


Urine Color    Yellow  (Yellow)  


 


Urine Appearance    Clear  (Clear)  


 


Urine pH    6.5  (5.0-8.0)  


 


Ur Specific Gravity    1.020  (1.005-1.030)  


 


Urine Protein    Negative  (Negative)  


 


Urine Glucose (UA)    Negative  (Negative)  


 


Urine Ketones    Negative  (Negative)  


 


Urine Occult Blood    Negative  (Negative)  


 


Urine Nitrite    Negative  (Negative)  


 


Urine Bilirubin    Negative  (Negative)  


 


Urine Urobilinogen    0.2  (0.2-1.0)  


 


Ur Leukocyte Esterase    Negative  (Negative)  


 


Urine RBC    0-5  (0-5)  /hpf


 


Urine WBC    0-5  (0-5)  /hpf


 


Ur Squamous Epith Cells    0-5  (0-5)  /hpf


 


Urine Bacteria    Occasional  (FEW)  /hpf


 


Urine Mucus    Not seen  (FEW)  /hpf











Meds: 


Medications














Discontinued Medications














Generic Name Dose Route Start Last Admin





  Trade Name Freq  PRN Reason Stop Dose Admin


 


Iopamidol  100 ml  12/23/21 16:39  12/23/21 16:53





  Iopamidol 612 Mg/Ml 100 Ml Bottle  IVPUSH  12/23/21 16:40  100 ml





  ONETIME ONE   Administration


 


Morphine Sulfate  4 mg  12/23/21 17:17  12/23/21 17:32





  Morphine 4 Mg/Ml Syringe  IVPUSH  12/23/21 17:18  4 mg





  ONETIME ONE   Administration


 


Ondansetron HCl  4 mg  12/23/21 17:17  12/23/21 17:31





  Ondansetron 4 Mg/2 Ml Sdv  IVPUSH  12/23/21 17:18  4 mg





  ONETIME ONE   Administration














Departure





- Departure


Time of Disposition: 17:46


Disposition: Home, Self-Care 01


Clinical Impression: 


 Ureteral stone








- Discharge Information


Instructions:  Abdominal Pain, Adult, Easy-to-Read


Referrals: 


Ayesha Geiger MD [Primary Care Provider] - 


Forms:  ED Department Discharge





- Assessment/Plan


Assessment:: 





Patient is a 56 old female presented to the emergency room with lower abdominal 

pain.  She had an unremarkable ER course.  Pain improved.  Abdominal exam was 

benign.  Differential diagnosis considered for this patient include 

appendicitis, diverticulitis, pyelonephritis, kidney stone.  Her laboratory 

studies did not show any signs of infection or other acute abnormalities.  CT 

scan demonstrates concern for possible ureteral stone according to virtual 

radiology.  At this point, no evidence of sepsis or other emergent pathology 

requiring intervention.  She will be discharged with pain medication and return 

precautions.  All questions were addressed and answered.  Patient agrees with 

plan of care.

## 2021-12-23 NOTE — CT
CT abdomen and pelvis

 

Technique: Multiple axial sections were obtained from above the dome 

of the diaphragm inferiorly through the pubic symphysis.  Intravenous 

contrast was utilized.  No oral contrast has been given.  

Reconstructed coronal and sagittal images were obtained.

 

Comparison: Prior CT abdomen and pelvis study of 06/22/11.

 

Findings: Visualized lung bases show nothing acute.

 

Fatty infiltration is seen within the liver.  Surgical clips are seen 

from prior cholecystectomy.  Small surgical clip is seen between the 

kidney and liver which is felt to be incidental.  Small amount of 

accessory splenic tissue is noted posterior to the spleen.  

 

Previous lap band has been removed.  Adrenal glands show no nodule.  

Pancreas shows no discrete abnormality.  Kidneys show symmetric 

contrast enhancement.  No ureteral dilatation is seen.  Small 

calcification is seen within the pelvis most likely outside the 

ureters.

 

Aorta and iliac vessels show atherosclerotic calcification with no 

aneurysm.  No retroperitoneal adenopathy is seen.  No mesenteric 

abnormalities are seen.

 

Prior hysterectomy is noted.  No pelvic mass or adenopathy is seen.

 

No bowel dilatation is seen.

 

Bone window settings were reviewed which show severe disc space 

narrowing at L5-S1 with vacuum phenomenon.  Minimal scattered endplate

 osteophytes are seen.  Degenerative change is also noted within the 

apophyseal joints at L4-5 and L5-S1.

 

Impression:

1.  Fatty infiltration with other findings as described above which 

are felt to be chronic and of no acute significance.

2.  Nothing acute is appreciated on CT study of the abdomen and 

pelvis.

 

Diagnostic code #2

 

I agree with preliminary report from [facility], finalized on 12/23/21, 6:19

 PM CST, code 1